# Patient Record
(demographics unavailable — no encounter records)

---

## 2025-06-26 NOTE — HISTORY OF PRESENT ILLNESS
[de-identified] : Mr. ROSEN is a 64 year y/o M presents to the office w cc of having a painful infected cyst to the upper back. Pt denies taking PO ABX.  History of excision of L posterior neck cyst, in the office, 08/01/24.

## 2025-06-26 NOTE — PLAN
[FreeTextEntry1] : PROCEDURE Informed consent was obtained. All the options, benefits and risks of the procedure discussed. Pre op diagnosis: infected cyst upper back  Post op diagnosis: same Procedure: Incision and drainage of abscess Anaesthesia: Local Prepping and draping was done in the usual sterile manner. Local anesthetic / lidocaine was injected in the area. The fluctuant part was incised and the pus was drained. Hemostat was used to drain the cavity. Irrigated and packed. Sent for culture. Dressings applied Wound care discussed.

## 2025-06-26 NOTE — CONSULT LETTER
[Dear  ___] : Dear  [unfilled], [Consult Letter:] : I had the pleasure of evaluating your patient, [unfilled]. [Consult Closing:] : Thank you very much for allowing me to participate in the care of this patient.  If you have any questions, please do not hesitate to contact me. [Sincerely,] : Sincerely, [FreeTextEntry3] : Yaniv Nunez MD General Surgery

## 2025-06-26 NOTE — PHYSICAL EXAM
[Alert] : alert [Oriented to Person] : oriented to person [Oriented to Place] : oriented to place [Oriented to Time] : oriented to time [Calm] : calm [de-identified] : A/Ox3; NAD. appears comfortable  [de-identified] : scar s/p cyst excision L posterior neck  [de-identified] : infected cyst to upper back, tender /indurated